# Patient Record
Sex: MALE | Race: WHITE | NOT HISPANIC OR LATINO | Employment: STUDENT | URBAN - METROPOLITAN AREA
[De-identification: names, ages, dates, MRNs, and addresses within clinical notes are randomized per-mention and may not be internally consistent; named-entity substitution may affect disease eponyms.]

---

## 2018-07-21 ENCOUNTER — OFFICE VISIT (OUTPATIENT)
Dept: URGENT CARE | Facility: CLINIC | Age: 23
End: 2018-07-21
Payer: COMMERCIAL

## 2018-07-21 VITALS
HEART RATE: 80 BPM | SYSTOLIC BLOOD PRESSURE: 114 MMHG | TEMPERATURE: 98 F | DIASTOLIC BLOOD PRESSURE: 71 MMHG | WEIGHT: 140 LBS | RESPIRATION RATE: 20 BRPM | BODY MASS INDEX: 19.6 KG/M2 | OXYGEN SATURATION: 96 % | HEIGHT: 71 IN

## 2018-07-21 DIAGNOSIS — L02.211 ABDOMINAL WALL ABSCESS: Primary | ICD-10-CM

## 2018-07-21 PROCEDURE — 3008F BODY MASS INDEX DOCD: CPT | Mod: CPTII,S$GLB,, | Performed by: EMERGENCY MEDICINE

## 2018-07-21 PROCEDURE — 90471 IMMUNIZATION ADMIN: CPT | Mod: S$GLB,,, | Performed by: EMERGENCY MEDICINE

## 2018-07-21 PROCEDURE — 99203 OFFICE O/P NEW LOW 30 MIN: CPT | Mod: 25,S$GLB,, | Performed by: EMERGENCY MEDICINE

## 2018-07-21 PROCEDURE — 90715 TDAP VACCINE 7 YRS/> IM: CPT | Mod: S$GLB,,, | Performed by: EMERGENCY MEDICINE

## 2018-07-21 RX ORDER — SULFAMETHOXAZOLE AND TRIMETHOPRIM 800; 160 MG/1; MG/1
1 TABLET ORAL 2 TIMES DAILY
Qty: 20 TABLET | Refills: 0 | Status: SHIPPED | OUTPATIENT
Start: 2018-07-21 | End: 2018-07-31

## 2018-07-21 RX ORDER — MUPIROCIN 20 MG/G
OINTMENT TOPICAL
Qty: 1 TUBE | Refills: 0 | Status: SHIPPED | OUTPATIENT
Start: 2018-07-21

## 2018-07-21 RX ORDER — GUANFACINE 4 MG/1
TABLET, EXTENDED RELEASE ORAL
COMMUNITY

## 2018-07-21 NOTE — LETTER
July 21, 2018      [facility]  [address] [address]  Phone: [phone]  Fax: [phone]       Patient: Chapito Liu   YOB: 1995  Date of Visit: 07/21/2018    To Whom It May Concern:    Ramiro Liu  was at [facility] on 07/21/2018. He can return to work tomorrow 7/22/18 but must keep his wound clean. He had a abscess drained here today.  If you have any questions or concerns, or if I can be of further assistance, please do not hesitate to contact me.    Sincerely,    Rylie Mathis MD

## 2018-07-21 NOTE — PATIENT INSTRUCTIONS
Abscess/Cellulitis   If your condition worsens or fails to improve we recommend that you receive another evaluation at the ER immediately or contact your PCP to discuss your concerns or return here. You must understand that you've received an urgent care treatment only and that you may be released before all your medical problems are known or treated. You the patient will arrange for follouwp care as instructed.   Return in 48 hours for recheck.   Use warm compresses for 20 minutes 3-5 times a day. Avoid picking or manipulating the wound. Clean the wound twice a day and put the antibiotic ointment on it. You should seek ER treatment if fever, increase pain, swelling or other signs of increasing infection.   If you are female and on BCP use additional methods to prevent pregnancy while on antibiotics and for one cycle after.   If you were given narcotics do not drive or operate heavy equipment or machinery while taking these medications.   Tylenol or ibuprofen can also be used as directed for pain unless you have an allergy to them or medical condition such as stomach ulcers, kidney or liver disease or blood thinners etc for which you should not be taking these type of medications.

## 2018-07-21 NOTE — PROGRESS NOTES
"Subjective:       Patient ID: Chapito Liu is a 23 y.o. male.    Vitals:  height is 5' 11" (1.803 m) and weight is 63.5 kg (140 lb). His oral temperature is 98.1 °F (36.7 °C). His blood pressure is 114/71 and his pulse is 80. His respiration is 20 and oxygen saturation is 96%.     Chief Complaint: Cyst    Pt thought he had a pimple on his stomach and tried to pop it a few days ago and now it is more swollen and painful. No hx DM. Needs a tetanus shot No previous hx MRSA      Cyst   This is a new problem. The current episode started in the past 7 days. The problem occurs constantly. The problem has been gradually worsening. Pertinent negatives include no chills, fever, rash or sore throat. Exacerbated by: Movement. He has tried nothing for the symptoms. The treatment provided no relief.     Review of Systems   Constitution: Negative for chills and fever.   HENT: Negative for sore throat.    Respiratory: Negative for shortness of breath.    Skin: Negative for itching and rash.   Musculoskeletal: Negative for joint pain.       Objective:      Physical Exam   Constitutional: He is oriented to person, place, and time. He appears well-developed and well-nourished.   HENT:   Head: Normocephalic and atraumatic. Head is without abrasion, without contusion and without laceration.   Right Ear: External ear normal.   Left Ear: External ear normal.   Nose: Nose normal.   Mouth/Throat: Oropharynx is clear and moist.   Minimal jaw excursion (chronic and being evaluated) Unable to see posterior oropharynx   Eyes: Conjunctivae, EOM and lids are normal. Pupils are equal, round, and reactive to light.   Neck: Trachea normal, full passive range of motion without pain and phonation normal. Neck supple.   Cardiovascular: Normal rate, regular rhythm and normal heart sounds.    Pulmonary/Chest: Effort normal and breath sounds normal. No stridor. No respiratory distress.   Musculoskeletal: Normal range of motion.   Neurological: He is " alert and oriented to person, place, and time.   Skin: Skin is warm, dry and intact. Capillary refill takes less than 2 seconds. Lesion noted. No abrasion, no bruising, no burn, no ecchymosis, no laceration and no rash noted. There is erythema.        Psychiatric: He has a normal mood and affect. His speech is normal and behavior is normal. Judgment and thought content normal. Cognition and memory are normal.   Nursing note and vitals reviewed.    Incision & Drainage  Date/Time: 7/21/2018 4:38 PM  Performed by: NIK VELAZQUEZ  Authorized by: NIK VELAZQUEZ       Type:  Abscess  Body area:  Trunk  Anesthesia:  Local infiltration  Local anesthetic:  Lidocaine 1% without epinephrinelidocaine 1% without epinephrine  Anesthetic total (ml):  5  Scalpel size:  11  Incision type:  Single straight  Complexity:  Complex  Drainage:  Pus  Drainage amount:  Moderate  Wound treatment:  Incision, wound left open, wound packed, deloculation and drainage  Packing material:  1/4 in gauze  Patient tolerance:  Patient tolerated the procedure well with no immediate complications      Assessment:       1. Abdominal wall abscess        Plan:         Abdominal wall abscess    Other orders  -     Tdap Vaccine  -     sulfamethoxazole-trimethoprim 800-160mg (BACTRIM DS) 800-160 mg Tab; Take 1 tablet by mouth 2 (two) times daily. for 10 days  Dispense: 20 tablet; Refill: 0  -     mupirocin (BACTROBAN) 2 % ointment; Apply topically to wound twice a day  Dispense: 1 Tube; Refill: 0  -     INCISION AND DRAINAGE          Patient Instructions                                                      Abscess/Cellulitis   If your condition worsens or fails to improve we recommend that you receive another evaluation at the ER immediately or contact your PCP to discuss your concerns or return here. You must understand that you've received an urgent care treatment only and that you may be released before all your medical problems are known or treated.  You the patient will arrange for follouwp care as instructed.   Return in 48 hours for recheck.   Use warm compresses for 20 minutes 3-5 times a day. Avoid picking or manipulating the wound. Clean the wound twice a day and put the antibiotic ointment on it. You should seek ER treatment if fever, increase pain, swelling or other signs of increasing infection.   If you are female and on BCP use additional methods to prevent pregnancy while on antibiotics and for one cycle after.   If you were given narcotics do not drive or operate heavy equipment or machinery while taking these medications.   Tylenol or ibuprofen can also be used as directed for pain unless you have an allergy to them or medical condition such as stomach ulcers, kidney or liver disease or blood thinners etc for which you should not be taking these type of medications.

## 2018-07-23 ENCOUNTER — CLINICAL SUPPORT (OUTPATIENT)
Dept: URGENT CARE | Facility: CLINIC | Age: 23
End: 2018-07-23
Payer: COMMERCIAL

## 2018-07-23 VITALS
BODY MASS INDEX: 19.6 KG/M2 | WEIGHT: 140 LBS | TEMPERATURE: 98 F | HEIGHT: 71 IN | OXYGEN SATURATION: 98 % | HEART RATE: 87 BPM | DIASTOLIC BLOOD PRESSURE: 65 MMHG | SYSTOLIC BLOOD PRESSURE: 116 MMHG | RESPIRATION RATE: 18 BRPM

## 2018-07-23 DIAGNOSIS — Z51.89 WOUND CHECK, ABSCESS: Primary | ICD-10-CM

## 2018-07-23 PROCEDURE — 10060 I&D ABSCESS SIMPLE/SINGLE: CPT | Mod: S$GLB,,, | Performed by: NURSE PRACTITIONER

## 2018-07-23 PROCEDURE — 99214 OFFICE O/P EST MOD 30 MIN: CPT | Mod: 25,S$GLB,, | Performed by: NURSE PRACTITIONER

## 2018-07-23 NOTE — PROCEDURES
"Incision & Drainage  Date/Time: 7/23/2018 9:41 AM  Performed by: NHAN RIVERO  Authorized by: NHAN RIVERO     Time out: Immediately prior to procedure a "time out" was called to verify the correct patient, procedure, equipment, support staff and site/side marked as required.    Consent Done?:  Yes (Verbal)    Type:  Abscess  Body area:  Trunk  Location details:  Abdomen  Drainage:  Bloody and purulent  Drainage amount:  Scant  Wound treatment:  Wound packed  Packing material:  1/4 in gauze  Patient tolerance:  Patient tolerated the procedure well with no immediate complications      "

## 2018-07-23 NOTE — LETTER
July 23, 2018      [facility] [address] [address]  Phone: [phone]  Fax: [phone]       Patient: Chapito Liu   YOB: 1995  Date of Visit: 07/23/2018    To Whom It May Concern:    Ramiro Liu  was at [facility] on 07/23/2018. He may return to work/school on 07/26/18 with no restrictions. If you have any questions or concerns, or if I can be of further assistance, please do not hesitate to contact me.    Sincerely,    Ninfa Torres, NP

## 2018-07-23 NOTE — PATIENT INSTRUCTIONS
Abscess (Incision & Drainage)  An abscess is sometimes called a boil. It happens when bacteria get trapped under the skin and start to grow. Pus forms inside the abscess as the body responds to the bacteria. An abscess can happen with an insect bite, ingrown hair, blocked oil gland, pimple, cyst, or puncture wound.  Your healthcare provider has drained the pus from your abscess. If the abscess pocket was large, your healthcare provider may have put in gauze packing. Your provider will need to remove it on your next visit. He or she may also replace it at that time. You may not need antibiotics to treat a simple abscess, unless the infection is spreading into the skin around the wound (cellulitis).  The wound will take about 1 to 2 weeks to heal, depending on the size of the abscess. Healthy tissue will grow from the bottom and sides of the opening until it seals over.  Home care  These tips can help your wound heal:  · The wound may drain for the first 2 days. Cover the wound with a clean dry dressing. Change the dressing if it becomes soaked with blood or pus.  · If a gauze packing was placed inside the abscess pocket, you may be told to remove it yourself. You may do this in the shower. Once the packing is removed, you should wash the area in the shower, or clean the area as directed by your provider. Continue to do this until the skin opening has closed. Make sure you wash your hands after changing the packing or cleaning the wound.  · If you were prescribed antibiotics, take them as directed until they are all gone.  · You may use acetaminophen or ibuprofen to control pain, unless another pain medicine was prescribed. If you have liver disease or ever had a stomach ulcer, talk with your doctor before using these medicines.  Follow-up care  Follow up with your healthcare provider, or as advised. If a gauze packing was put in your wound, it should be removed in 1 to 2 days. Check your wound every day for any  signs that the infection is getting worse. The signs are listed below.  When to seek medical advice  Call your healthcare provider right away if any of these occur:  · Increasing redness or swelling  · Red streaks in the skin leading away from the wound  · Increasing local pain or swelling  · Continued pus draining from the wound 2 days after treatment  · Fever of 100.4ºF (38ºC) or higher, or as directed by your healthcare provider  · Boil returns when you are at home  Date Last Reviewed: 9/1/2016  © 3337-2289 [employer]. [address] [place] [id number]. All rights reserved. This information is not intended as a substitute for professional medical care. Always follow your healthcare professional's instructions.    Please arrange follow up with your primary medical clinic as soon as possible. You must understand that you've received an Urgent Care treatment only and that you may be released before all of your medical problems are known or treated. You, the patient, will arrange for follow up as instructed. If your symptoms worsen or fail to improve you should go to the Emergency Room.

## 2018-07-23 NOTE — PROGRESS NOTES
"Subjective:       Patient ID: Chapito Liu is a 23 y.o. male.    Vitals:  height is 5' 11" (1.803 m) and weight is 63.5 kg (140 lb). His tympanic temperature is 98 °F (36.7 °C). His blood pressure is 116/65 and his pulse is 87. His respiration is 18 and oxygen saturation is 98%.     Chief Complaint: Abscess    Patient presents with complaint of abscess. Patient was here two days ago and saw Dr. Mathis. He had the area lanced. Patient reports the area is not worse. No chills or fever. Patient states there were two more areas of redness so he has been applying mupirocin to wounds.     Packing removed from open incision to LLQ abd and re-packed, pt tolerated procedure well. Pt admits area is painful but refused pain medication, states he does not even take tylenol/Motrin.        Abscess   Chronicity:  New  Incident onset: few days.  Progression Since Onset: unchanged  Associated Symptoms: no fever, no chills  Characteristics: painful and redness    Pain Scale:  5/10  Treatments Tried:  Topical antibiotics and oral antibiotics    Review of Systems   Constitution: Negative for chills and fever.   HENT: Negative for sore throat.    Respiratory: Negative for shortness of breath.    Skin: Negative for itching and rash.        redness   Musculoskeletal: Negative for joint pain.   All other systems reviewed and are negative.      Objective:      Physical Exam   Constitutional: He is oriented to person, place, and time. He appears well-developed and well-nourished.   HENT:   Head: Normocephalic and atraumatic. Head is without abrasion, without contusion and without laceration.   Right Ear: External ear normal.   Left Ear: External ear normal.   Nose: Nose normal.   Mouth/Throat: Oropharynx is clear and moist.   Eyes: Conjunctivae, EOM and lids are normal. Pupils are equal, round, and reactive to light.   Neck: Trachea normal, full passive range of motion without pain and phonation normal. Neck supple.   Cardiovascular: " Normal rate, regular rhythm and normal heart sounds.    Pulmonary/Chest: Effort normal and breath sounds normal. No stridor. No respiratory distress.   Musculoskeletal: Normal range of motion.   Neurological: He is alert and oriented to person, place, and time.   Skin: Skin is warm, dry and intact. Capillary refill takes less than 2 seconds. No abrasion, no bruising, no burn, no ecchymosis, no laceration, no lesion and no rash noted. There is erythema.        Psychiatric: He has a normal mood and affect. His speech is normal and behavior is normal. Judgment and thought content normal. Cognition and memory are normal.   Nursing note and vitals reviewed.      Assessment:       1. Wound check, abscess        Plan:     pt instructed to f/u in 48hrs for wound re-check. Apply warm compresses and mupirocin ointment to x2 abscess RLQ.     Wound check, abscess  -     INCISION AND DRAINAGE

## 2018-07-27 ENCOUNTER — CLINICAL SUPPORT (OUTPATIENT)
Dept: URGENT CARE | Facility: CLINIC | Age: 23
End: 2018-07-27
Payer: COMMERCIAL

## 2018-07-27 VITALS
DIASTOLIC BLOOD PRESSURE: 66 MMHG | HEIGHT: 71 IN | BODY MASS INDEX: 19.6 KG/M2 | TEMPERATURE: 98 F | OXYGEN SATURATION: 98 % | RESPIRATION RATE: 18 BRPM | WEIGHT: 140 LBS | SYSTOLIC BLOOD PRESSURE: 111 MMHG | HEART RATE: 71 BPM

## 2018-07-27 DIAGNOSIS — Z51.89 WOUND CHECK, ABSCESS: Primary | ICD-10-CM

## 2018-07-27 PROCEDURE — 99499 UNLISTED E&M SERVICE: CPT | Mod: S$GLB,,, | Performed by: NURSE PRACTITIONER

## 2018-07-27 NOTE — LETTER
July 27, 2018      [facility] [address] [address]  Phone: [phone]  Fax: [phone]       Patient: Chapito Liu   YOB: 1995  Date of Visit: 07/27/2018    To Whom It May Concern:    Ramiro Liu  was at [facility] on 07/21/18. He may return to work/school on 07/30/18 with no restrictions. If you have any questions or concerns, or if I can be of further assistance, please do not hesitate to contact me.    Sincerely,    Ninfa Torres, NP

## 2018-07-27 NOTE — PROGRESS NOTES
"Subjective:       Patient ID: Chapito Liu is a 23 y.o. male.    Vitals:  height is 5' 11" (1.803 m) and weight is 63.5 kg (140 lb). His oral temperature is 98.3 °F (36.8 °C). His blood pressure is 111/66 and his pulse is 71. His respiration is 18 and oxygen saturation is 98%.     Chief Complaint: Wound Check    Pt was instructed to return to have an abscess on his stomach checked. Pt reports mild pain but feels it is healing well. Pt reports mild drainage.       Wound Check   He was originally treated 10 to 14 days ago. Previous treatment included I&D of abscess. There has been bloody discharge from the wound. There is no redness present. There is no swelling present. The pain has improved. He has no difficulty moving the affected extremity or digit.     Review of Systems   Constitution: Negative for chills and fever.   HENT: Negative for sore throat.    Eyes: Negative for blurred vision.   Cardiovascular: Negative for chest pain.   Respiratory: Negative for shortness of breath.    Skin: Negative for rash.   Musculoskeletal: Negative for back pain and joint pain.   Gastrointestinal: Negative for abdominal pain, diarrhea, nausea and vomiting.   Neurological: Negative for headaches.   Psychiatric/Behavioral: The patient is not nervous/anxious.    All other systems reviewed and are negative.      Objective:      Physical Exam   Constitutional: He is oriented to person, place, and time. He appears well-developed and well-nourished.   HENT:   Head: Normocephalic and atraumatic. Head is without abrasion, without contusion and without laceration.   Right Ear: External ear normal.   Left Ear: External ear normal.   Nose: Nose normal.   Mouth/Throat: Oropharynx is clear and moist.   Eyes: Conjunctivae, EOM and lids are normal. Pupils are equal, round, and reactive to light.   Neck: Trachea normal, full passive range of motion without pain and phonation normal. Neck supple.   Cardiovascular: Normal rate, regular rhythm " and normal heart sounds.    Pulmonary/Chest: Effort normal and breath sounds normal. No stridor. No respiratory distress.   Musculoskeletal: Normal range of motion.   Neurological: He is alert and oriented to person, place, and time.   Skin: Skin is warm, dry and intact. Capillary refill takes less than 2 seconds. No abrasion, no bruising, no burn, no ecchymosis, no laceration, no lesion and no rash noted. No erythema.        Psychiatric: He has a normal mood and affect. His speech is normal and behavior is normal. Judgment and thought content normal. Cognition and memory are normal.   Nursing note and vitals reviewed.      Assessment:       1. Wound check, abscess        Plan:     Follow up as needed. Keep wound clean and dry, continue to use Bactroban ointment.     Wound check, abscess

## 2018-07-27 NOTE — PATIENT INSTRUCTIONS
Wound Check, No Infection  Your wound is healing as expected. There are no signs of infection.   Home care  Continue to care for your wound as directed.  · Cover your wound with a bandage unless your healthcare provider tells you not to.  · Gently clean your wound with soap and water when you shower.   · Unless told otherwise, avoid swimming and taking tub baths until your wound has healed.  Follow-up care  Follow up with your healthcare provider as advised.  · If you have sutures or staples, return as directed to have them removed. If they are not taken out on time, they may be harder to remove and scarring may be worse. Infection may develop.  · If surgical tape strips were used, you can remove them yourself if they have not fallen off by 10 days after they were applied.   When to seek medical advice  Call your healthcare provider right away if any of these occur:  · Fever of 100.4ºF (38ºC) or higher, or as directed by your health care provider  · Symptoms of a wound infection, including:  ¨ Redness or swelling around the wound  ¨ Warmth coming from the wound  ¨ New or worsening pain  ¨ Red streaks around the wound  ¨ Draining pus  Date Last Reviewed: 8/24/2015  © 1181-9454 [employer]. [address] [place] [address], [place] [address]. All rights reserved. This information is not intended as a substitute for professional medical care. Always follow your healthcare professional's instructions.      Please arrange follow up with your primary medical clinic as soon as possible. You must understand that you've received an Urgent Care treatment only and that you may be released before all of your medical problems are known or treated. You, the patient, will arrange for follow up as instructed. If your symptoms worsen or fail to improve you should go to the Emergency Room.

## 2018-07-29 ENCOUNTER — TELEPHONE (OUTPATIENT)
Dept: URGENT CARE | Facility: CLINIC | Age: 23
End: 2018-07-29

## 2019-05-27 ENCOUNTER — OFFICE VISIT (OUTPATIENT)
Dept: URGENT CARE | Facility: CLINIC | Age: 24
End: 2019-05-27
Payer: COMMERCIAL

## 2019-05-27 VITALS
RESPIRATION RATE: 18 BRPM | OXYGEN SATURATION: 98 % | BODY MASS INDEX: 19.6 KG/M2 | WEIGHT: 140 LBS | TEMPERATURE: 98 F | DIASTOLIC BLOOD PRESSURE: 80 MMHG | SYSTOLIC BLOOD PRESSURE: 124 MMHG | HEIGHT: 71 IN | HEART RATE: 85 BPM

## 2019-05-27 DIAGNOSIS — B34.9 VIRAL SYNDROME: Primary | ICD-10-CM

## 2019-05-27 PROCEDURE — 99214 OFFICE O/P EST MOD 30 MIN: CPT | Mod: S$GLB,,, | Performed by: FAMILY MEDICINE

## 2019-05-27 PROCEDURE — 3008F PR BODY MASS INDEX (BMI) DOCUMENTED: ICD-10-PCS | Mod: CPTII,S$GLB,, | Performed by: FAMILY MEDICINE

## 2019-05-27 PROCEDURE — 3008F BODY MASS INDEX DOCD: CPT | Mod: CPTII,S$GLB,, | Performed by: FAMILY MEDICINE

## 2019-05-27 PROCEDURE — 99214 PR OFFICE/OUTPT VISIT, EST, LEVL IV, 30-39 MIN: ICD-10-PCS | Mod: S$GLB,,, | Performed by: FAMILY MEDICINE

## 2019-05-27 NOTE — LETTER
May 27, 2019      [facility]  [address] [address]  Phone: [phone]  Fax: [phone]       Patient: Chapito Liu   YOB: 1995  Date of Visit: 05/27/2019    To Whom It May Concern:    Ramiro Liu  was at [facility] on 05/27/2019 for symptoms of a viral syndrome that started on 5/23/19. His symptoms have now subsided. He may return to work/school on 5/27/19 with no restrictions. If you have any questions or concerns, or if I can be of further assistance, please do not hesitate to contact me.    Sincerely,    Pita Messer, DO

## 2019-05-27 NOTE — PROGRESS NOTES
"Subjective:       Patient ID: Chapito Liu is a 24 y.o. male.    Vitals:  height is 5' 11" (1.803 m) and weight is 63.5 kg (140 lb). His temperature is 97.8 °F (36.6 °C). His blood pressure is 124/80 and his pulse is 85. His respiration is 18 and oxygen saturation is 98%.     Chief Complaint: No chief complaint on file.    Abdominal Pain   This is a new problem. Episode onset: 4 days ago all symptoms have subsided. The onset quality is sudden. The most recent episode lasted 12 hours. The problem has been unchanged. The pain is located in the generalized abdominal region. The pain is at a severity of 0/10. The patient is experiencing no pain. Associated symptoms include nausea and vomiting. Pertinent negatives include no constipation, diarrhea, dysuria or fever. There is no history of abdominal surgery.       Constitution: Negative for appetite change, chills, sweating and fever.   Cardiovascular: Negative for chest pain.   Respiratory: Negative for shortness of breath.    Gastrointestinal: Positive for abdominal pain, nausea and vomiting. Negative for abdominal trauma, abdominal bloating, history of abdominal surgery, constipation, diarrhea, dark colored stools and heartburn.   Genitourinary: Negative for dysuria and pelvic pain.   Musculoskeletal: Negative for back pain.       Objective:      Physical Exam   Constitutional: He is oriented to person, place, and time. He appears well-developed and well-nourished.   HENT:   Head: Normocephalic and atraumatic.   Right Ear: External ear normal.   Left Ear: External ear normal.   Nose: Nose normal.   Mouth/Throat: Oropharynx is clear and moist.   Eyes: Pupils are equal, round, and reactive to light. Conjunctivae and EOM are normal.   Neck: Normal range of motion. Neck supple. No JVD present. No thyromegaly present.   Cardiovascular: Normal rate, regular rhythm, normal heart sounds and intact distal pulses.   No murmur heard.  Pulmonary/Chest: Effort normal and " "breath sounds normal.   Abdominal: Soft. He exhibits no mass. There is no tenderness.   Musculoskeletal: Normal range of motion. He exhibits no edema.   Lymphadenopathy:     He has no cervical adenopathy.   Neurological: He is alert and oriented to person, place, and time. He has normal reflexes.   Skin: Skin is warm and dry.   Psychiatric: He has a normal mood and affect. His behavior is normal. Judgment and thought content normal.   Nursing note and vitals reviewed.      Assessment:       1. Viral syndrome        Plan:         Viral syndrome      Patient Instructions     Viral Syndrome (Adult)  A viral illness may cause a number of symptoms. The symptoms depend on the part of the body that the virus affects. If it settles in your nose, throat, and lungs, it may cause cough, sore throat, congestion, and sometimes headache. If it settles in your stomach and intestinal tract, it may cause vomiting and diarrhea. Sometimes it causes vague symptoms like "aching all over," feeling tired, loss of appetite, or fever.  A viral illness usually lasts 1 to 2 weeks, but sometimes it lasts longer. In some cases, a more serious infection can look like a viral syndrome in the first few days of the illness. You may need another exam and additional tests to know the difference. Watch for the warning signs listed below.  Home care  Follow these guidelines for taking care of yourself at home:  · If symptoms are severe, rest at home for the first 2 to 3 days.  · Stay away from cigarette smoke - both your smoke and the smoke from others.  · You may use over-the-counter acetaminophen or ibuprofen for fever, muscle aching, and headache, unless another medicine was prescribed for this. If you have chronic liver or kidney disease or ever had a stomach ulcer or GI bleeding, talk with your doctor before using these medicines. No one who is younger than 18 and ill with a fever should take aspirin. It may cause severe disease or death.  · Your " appetite may be poor, so a light diet is fine. Avoid dehydration by drinking 8 to 12 8-ounce glasses of fluids each day. This may include water; orange juice; lemonade; apple, grape, and cranberry juice; clear fruit drinks; electrolyte replacement and sports drinks; and decaffeinated teas and coffee. If you have been diagnosed with a kidney disease, ask your doctor how much and what types of fluids you should drink to prevent dehydration. If you have kidney disease, drinking too much fluid can cause it build up in the your body and be dangerous to your health.  · Over-the-counter remedies won't shorten the length of the illness but may be helpful for cough, sore throat; and nasal and sinus congestion. Don't use decongestants if you have high blood pressure.  Follow-up care  Follow up with your healthcare provider if you do not improve over the next week.  Call 911  Get emergency medical care if any of the following occur:  · Convulsion  · Feeling weak, dizzy, or like you are going to faint  · Chest pain, shortness of breath, wheezing, or difficulty breathing  When to seek medical advice  Call your healthcare provider right away if any of these occur:  · Cough with lots of colored sputum (mucus) or blood in your sputum  · Chest pain, shortness of breath, wheezing, or difficulty breathing  · Severe headache; face, neck, or ear pain  · Severe, constant pain in the lower right side of your belly (abdominal)  · Continued vomiting (cant keep liquids down)  · Frequent diarrhea (more than 5 times a day); blood (red or black color) or mucus in diarrhea  · Feeling weak, dizzy, or like you are going to faint  · Extreme thirst  · Fever of 100.4°F (38°C) or higher, or as directed by your healthcare provider  Date Last Reviewed: 9/25/2015  © 9481-6637 [employer]. [address] [place] [address], [place] [address]. All rights reserved. This information is not intended as a substitute for professional medical care. Always  follow your healthcare professional's instructions.

## 2019-05-27 NOTE — PATIENT INSTRUCTIONS
"  Viral Syndrome (Adult)  A viral illness may cause a number of symptoms. The symptoms depend on the part of the body that the virus affects. If it settles in your nose, throat, and lungs, it may cause cough, sore throat, congestion, and sometimes headache. If it settles in your stomach and intestinal tract, it may cause vomiting and diarrhea. Sometimes it causes vague symptoms like "aching all over," feeling tired, loss of appetite, or fever.  A viral illness usually lasts 1 to 2 weeks, but sometimes it lasts longer. In some cases, a more serious infection can look like a viral syndrome in the first few days of the illness. You may need another exam and additional tests to know the difference. Watch for the warning signs listed below.  Home care  Follow these guidelines for taking care of yourself at home:  · If symptoms are severe, rest at home for the first 2 to 3 days.  · Stay away from cigarette smoke - both your smoke and the smoke from others.  · You may use over-the-counter acetaminophen or ibuprofen for fever, muscle aching, and headache, unless another medicine was prescribed for this. If you have chronic liver or kidney disease or ever had a stomach ulcer or GI bleeding, talk with your doctor before using these medicines. No one who is younger than 18 and ill with a fever should take aspirin. It may cause severe disease or death.  · Your appetite may be poor, so a light diet is fine. Avoid dehydration by drinking 8 to 12 8-ounce glasses of fluids each day. This may include water; orange juice; lemonade; apple, grape, and cranberry juice; clear fruit drinks; electrolyte replacement and sports drinks; and decaffeinated teas and coffee. If you have been diagnosed with a kidney disease, ask your doctor how much and what types of fluids you should drink to prevent dehydration. If you have kidney disease, drinking too much fluid can cause it build up in the your body and be dangerous to your " health.  · Over-the-counter remedies won't shorten the length of the illness but may be helpful for cough, sore throat; and nasal and sinus congestion. Don't use decongestants if you have high blood pressure.  Follow-up care  Follow up with your healthcare provider if you do not improve over the next week.  Call 911  Get emergency medical care if any of the following occur:  · Convulsion  · Feeling weak, dizzy, or like you are going to faint  · Chest pain, shortness of breath, wheezing, or difficulty breathing  When to seek medical advice  Call your healthcare provider right away if any of these occur:  · Cough with lots of colored sputum (mucus) or blood in your sputum  · Chest pain, shortness of breath, wheezing, or difficulty breathing  · Severe headache; face, neck, or ear pain  · Severe, constant pain in the lower right side of your belly (abdominal)  · Continued vomiting (cant keep liquids down)  · Frequent diarrhea (more than 5 times a day); blood (red or black color) or mucus in diarrhea  · Feeling weak, dizzy, or like you are going to faint  · Extreme thirst  · Fever of 100.4°F (38°C) or higher, or as directed by your healthcare provider  Date Last Reviewed: 9/25/2015  © 5289-5324 [employer]. [address] [place] [address], [place] [address]. All rights reserved. This information is not intended as a substitute for professional medical care. Always follow your healthcare professional's instructions.

## 2020-03-22 ENCOUNTER — HOSPITAL ENCOUNTER (EMERGENCY)
Facility: HOSPITAL | Age: 25
Discharge: HOME | End: 2020-03-22
Attending: EMERGENCY MEDICINE
Payer: COMMERCIAL

## 2020-03-22 VITALS
RESPIRATION RATE: 18 BRPM | SYSTOLIC BLOOD PRESSURE: 132 MMHG | HEART RATE: 77 BPM | TEMPERATURE: 98 F | DIASTOLIC BLOOD PRESSURE: 79 MMHG | BODY MASS INDEX: 19.53 KG/M2 | WEIGHT: 140 LBS | OXYGEN SATURATION: 99 %

## 2020-03-22 DIAGNOSIS — R10.11 RUQ ABDOMINAL PAIN: ICD-10-CM

## 2020-03-22 DIAGNOSIS — R74.01 TRANSAMINITIS: Primary | ICD-10-CM

## 2020-03-22 LAB
ALBUMIN SERPL BCP-MCNC: 4.6 G/DL (ref 3.5–5.2)
ALP SERPL-CCNC: 68 U/L (ref 55–135)
ALT SERPL W/O P-5'-P-CCNC: 1026 U/L (ref 10–44)
ANION GAP SERPL CALC-SCNC: 13 MMOL/L (ref 8–16)
AST SERPL-CCNC: 1562 U/L (ref 10–40)
BASOPHILS # BLD AUTO: 0.1 K/UL (ref 0–0.2)
BASOPHILS NFR BLD: 1.2 % (ref 0–1.9)
BILIRUB SERPL-MCNC: 1.1 MG/DL (ref 0.1–1)
BUN SERPL-MCNC: 10 MG/DL (ref 6–20)
CALCIUM SERPL-MCNC: 10 MG/DL (ref 8.7–10.5)
CHLORIDE SERPL-SCNC: 101 MMOL/L (ref 95–110)
CO2 SERPL-SCNC: 19 MMOL/L (ref 23–29)
CREAT SERPL-MCNC: 1 MG/DL (ref 0.5–1.4)
DIFFERENTIAL METHOD: NORMAL
EOSINOPHIL # BLD AUTO: 0.2 K/UL (ref 0–0.5)
EOSINOPHIL NFR BLD: 2.2 % (ref 0–8)
ERYTHROCYTE [DISTWIDTH] IN BLOOD BY AUTOMATED COUNT: 13.1 % (ref 11.5–14.5)
EST. GFR  (AFRICAN AMERICAN): >60 ML/MIN/1.73 M^2
EST. GFR  (NON AFRICAN AMERICAN): >60 ML/MIN/1.73 M^2
GLUCOSE SERPL-MCNC: 105 MG/DL (ref 70–110)
HCT VFR BLD AUTO: 52 % (ref 40–54)
HGB BLD-MCNC: 17.1 G/DL (ref 14–18)
IMM GRANULOCYTES # BLD AUTO: 0.03 K/UL (ref 0–0.04)
IMM GRANULOCYTES NFR BLD AUTO: 0.4 % (ref 0–0.5)
LIPASE SERPL-CCNC: 11 U/L (ref 4–60)
LYMPHOCYTES # BLD AUTO: 1.7 K/UL (ref 1–4.8)
LYMPHOCYTES NFR BLD: 20 % (ref 18–48)
MCH RBC QN AUTO: 29.5 PG (ref 27–31)
MCHC RBC AUTO-ENTMCNC: 32.9 G/DL (ref 32–36)
MCV RBC AUTO: 90 FL (ref 82–98)
MONOCYTES # BLD AUTO: 0.7 K/UL (ref 0.3–1)
MONOCYTES NFR BLD: 8.8 % (ref 4–15)
NEUTROPHILS # BLD AUTO: 5.6 K/UL (ref 1.8–7.7)
NEUTROPHILS NFR BLD: 67.4 % (ref 38–73)
NRBC BLD-RTO: 0 /100 WBC
PLATELET # BLD AUTO: 235 K/UL (ref 150–350)
PMV BLD AUTO: 11.1 FL (ref 9.2–12.9)
POTASSIUM SERPL-SCNC: 3.9 MMOL/L (ref 3.5–5.1)
PROT SERPL-MCNC: 8.4 G/DL (ref 6–8.4)
RBC # BLD AUTO: 5.79 M/UL (ref 4.6–6.2)
SODIUM SERPL-SCNC: 133 MMOL/L (ref 136–145)
WBC # BLD AUTO: 8.31 K/UL (ref 3.9–12.7)

## 2020-03-22 PROCEDURE — S0028 INJECTION, FAMOTIDINE, 20 MG: HCPCS | Performed by: GENERAL PRACTICE

## 2020-03-22 PROCEDURE — 80053 COMPREHEN METABOLIC PANEL: CPT

## 2020-03-22 PROCEDURE — 80074 ACUTE HEPATITIS PANEL: CPT

## 2020-03-22 PROCEDURE — 96374 THER/PROPH/DIAG INJ IV PUSH: CPT

## 2020-03-22 PROCEDURE — 85025 COMPLETE CBC W/AUTO DIFF WBC: CPT

## 2020-03-22 PROCEDURE — 25000003 PHARM REV CODE 250: Performed by: GENERAL PRACTICE

## 2020-03-22 PROCEDURE — 96361 HYDRATE IV INFUSION ADD-ON: CPT

## 2020-03-22 PROCEDURE — 99284 EMERGENCY DEPT VISIT MOD MDM: CPT | Mod: 25

## 2020-03-22 PROCEDURE — 96375 TX/PRO/DX INJ NEW DRUG ADDON: CPT | Mod: 59

## 2020-03-22 PROCEDURE — 83690 ASSAY OF LIPASE: CPT

## 2020-03-22 PROCEDURE — 99285 EMERGENCY DEPT VISIT HI MDM: CPT | Mod: ,,, | Performed by: EMERGENCY MEDICINE

## 2020-03-22 PROCEDURE — 63600175 PHARM REV CODE 636 W HCPCS: Performed by: GENERAL PRACTICE

## 2020-03-22 PROCEDURE — 86703 HIV-1/HIV-2 1 RESULT ANTBDY: CPT

## 2020-03-22 PROCEDURE — 99285 PR EMERGENCY DEPT VISIT,LEVEL V: ICD-10-PCS | Mod: ,,, | Performed by: EMERGENCY MEDICINE

## 2020-03-22 RX ORDER — SODIUM CHLORIDE 9 MG/ML
1000 INJECTION, SOLUTION INTRAVENOUS
Status: COMPLETED | OUTPATIENT
Start: 2020-03-22 | End: 2020-03-22

## 2020-03-22 RX ORDER — ONDANSETRON 2 MG/ML
4 INJECTION INTRAMUSCULAR; INTRAVENOUS
Status: COMPLETED | OUTPATIENT
Start: 2020-03-22 | End: 2020-03-22

## 2020-03-22 RX ORDER — FAMOTIDINE 10 MG/ML
20 INJECTION INTRAVENOUS
Status: COMPLETED | OUTPATIENT
Start: 2020-03-22 | End: 2020-03-22

## 2020-03-22 RX ADMIN — FAMOTIDINE 20 MG: 10 INJECTION INTRAVENOUS at 01:03

## 2020-03-22 RX ADMIN — SODIUM CHLORIDE 1000 ML: 0.9 INJECTION, SOLUTION INTRAVENOUS at 01:03

## 2020-03-22 RX ADMIN — ONDANSETRON 4 MG: 2 INJECTION INTRAMUSCULAR; INTRAVENOUS at 01:03

## 2020-03-22 RX ADMIN — ALUMINUM HYDROXIDE, MAGNESIUM HYDROXIDE, AND SIMETHICONE 50 ML: 200; 200; 20 SUSPENSION ORAL at 01:03

## 2020-03-22 NOTE — ED TRIAGE NOTES
Pt arrived to ED with complaints of abdominal pain to the right upper quadrant. Pain is not severe but he states it has been bothering him for a couple of months. Pt has  vomited 4 times today. The first couple times he vomited bile and then was dry heaving. Pt denies blood in emesis , stools, or urine. No change in urinary or bowel habits. Pt denies respiratory symptoms or chest pain.     Pt aaoX4 and ambulatory with no assistance needed .     Psychosocial:  Patient is calm and cooperative.  Patients insight and judgement are appropriate to situation.  Appears clean, well maintained, with clothing appropriate to environment.  No evidence of delusions, hallucinations, or psychosis.     Neuro:  Eyes open spontaneously.  Awake, alert, oriented x 4.  Speech clear and appropriate.  Tolerating saliva secretions well.  Able to follow commands, demonstrating ability to actively and appropriately communicate within context of current conversation.  Symmetrical facial muscles.  Moving all extremities well with no noted weakness.  Adequate muscle tone present.    Movement is purposeful.  No evidence of impaired sensation.  Response to external stimuli appropriately.  No noted drifts.     Airway:  Bilateral chest rise and fall.  RR regular and non-labored.  Air entry patent with and clear x 5 lobes of the lungs.  No crepitus or subcutaneous emphysema noted on palpation.       Circulatory:  Skin warm, dry, and pink.  Apical and radial pulses strong and regular.  Capillary refill/skin blanching less than 3 seconds to distal of 4 extremities.     Abdomen:  Abdomen soft and non-distended.  Positive normo-active bowel sounds x 4 quadrants.       Urinary: Denies pressure, frequency, urgency, odor or pain.     Extremities:  No redness, heat, deformity, or pain.     Skin:  Intact with no bruising/discolorations noted.

## 2020-03-22 NOTE — ED PROVIDER NOTES
Encounter Date: 3/22/2020       History     Chief Complaint   Patient presents with    Abdominal Pain     for three months that worsened tonight. pain is RUQ and radiates to back     Chapito Liu is a 25 year old male with tobacco dependence presenting to the emergency department for evaluation of x three month's of intermittent RUQ pain (sharp/gnawing) associated with nausea/emesis.  Denies any associated worsening or improvement with food or drink; described vomitus as bilious earlier today; denies fevers, chills, myalgias, RUQ radiating into right flank.  Denies any dysuria/hematuria, CP, SOB.  Rated pain about a 5/10 today; otherwise no acute health concerns at this time.         Review of patient's allergies indicates:  No Known Allergies  No past medical history on file.  No past surgical history on file.  Family History   Problem Relation Age of Onset    No Known Problems Mother     No Known Problems Father      Social History     Tobacco Use    Smoking status: Current Every Day Smoker    Smokeless tobacco: Never Used   Substance Use Topics    Alcohol use: Yes    Drug use: No     Review of Systems   Constitutional: Positive for activity change and appetite change. Negative for fatigue and fever.   HENT: Negative for trouble swallowing and voice change.    Eyes: Negative for photophobia and visual disturbance.   Respiratory: Negative for shortness of breath, wheezing and stridor.    Cardiovascular: Negative for chest pain and palpitations.   Gastrointestinal: Positive for abdominal pain, nausea and vomiting.   Genitourinary: Negative for dysuria, flank pain, frequency and hematuria.   Musculoskeletal: Negative for arthralgias and myalgias.   Skin: Negative for color change and wound.   Neurological: Negative for dizziness, light-headedness and headaches.   Psychiatric/Behavioral: Negative for agitation, behavioral problems and confusion.     Physical Exam     Initial Vitals [03/22/20 0034]   BP  Pulse Resp Temp SpO2   129/84 95 20 97.9 °F (36.6 °C) 98 %      MAP       --         Physical Exam    Nursing note and vitals reviewed.  Constitutional: He appears well-developed and well-nourished. He is not diaphoretic. He does not appear ill. No distress.   Appear's uncomfortable.    Eyes: Pupils are equal, round, and reactive to light. No scleral icterus.   Cardiovascular: Normal rate, regular rhythm and normal heart sounds.   Pulmonary/Chest: Effort normal and breath sounds normal. No respiratory distress. He has no wheezes.   Abdominal: Soft. Normal appearance and bowel sounds are normal. He exhibits no mass. There is tenderness in the right upper quadrant. There is positive Dove's sign. There is no rigidity and no guarding. No hernia. Hernia confirmed negative in the ventral area.   Neurological: He is alert and oriented to person, place, and time.   Skin: Skin is warm. No erythema. No pallor.   Psychiatric: He has a normal mood and affect. His behavior is normal.       ED Course   Procedures  Labs Reviewed   COMPREHENSIVE METABOLIC PANEL - Abnormal; Notable for the following components:       Result Value    Sodium 133 (*)     CO2 19 (*)     Total Bilirubin 1.1 (*)     AST 1,562 (*)     ALT 1,026 (*)     All other components within normal limits   CBC W/ AUTO DIFFERENTIAL   LIPASE   HEPATITIS PANEL, ACUTE   HIV 1 / 2 ANTIBODY   HEPATITIS PANEL, ACUTE   HIV 1 / 2 ANTIBODY          Imaging Results          US Abdomen Limited (Gallbladder) (Final result)  Result time 03/22/20 02:42:19    Final result by Brain Patrick MD (03/22/20 02:42:19)                 Impression:      No evidence of cholelithiasis or acute cholecystitis.    Electronically signed by resident: Dori Hi  Date:    03/22/2020  Time:    02:07    Electronically signed by: Brain Patrick MD  Date:    03/22/2020  Time:    02:42             Narrative:    EXAMINATION:  US ABDOMEN LIMITED    CLINICAL HISTORY:  Right upper quadrant  pain    TECHNIQUE:  Limited ultrasound of the right upper quadrant of the abdomen was performed, with special attention to the gallbladder and biliary system.    COMPARISON:  None.    FINDINGS:  The partially visualized liver is within normal limits.    There is no intra or extrahepatic bile duct dilatation.  The common duct measures 2 mm.    The gallbladder is unremarkable with no evidence of wall thickening, pericholecystic fluid, sonographic Dove's sign, or cholelithiasis.    The visualized portions of the pancreas are unremarkable.    There is no free fluid within the visualized abdomen.  Limited visualization of the right kidney is negative for hydronephrosis or other acute abnormality.                                 Medical Decision Making:   History:   Old Medical Records: I decided to obtain old medical records.  Initial Assessment:   25 year old male with no significant past medical history presented to the emergency department with RUQ x 3 month's with nausea/vomiting.  DDx includes but not limited to acute cholecystitis, cholelithiasis, cholangitis, pancreatitis, choledocholithiasis, hepatitis/HIV.  Basic labs, lipase, UA, RUQ ultrasounds, IV zofran and pepcid, GI cocktail.  Will continue to follow closely at this time.       Ran Garcia DO  PGY-2 Emergency Medicine  2:11 AM    Clinical Tests:   Lab Tests: Ordered and Reviewed  Radiological Study: Ordered and Reviewed  Medical Tests: Ordered and Reviewed  ED Management:  2:11 AM Significantly elevated AST/ALT with elevated T.Bili; no elevation in alkaline phosphatase.  WBC (normal); afebrile.  Suspect sub-acute hepatitis versus cholecystitis.  Hepatitis panel/HIV added-on to evaluation.  RUQ ultrasound pending at this time.      3:13 AM RUQ without evidence of acute cholecystitis; findings of imaging and labs discussed with patient; patient now more truthful about 6-12 beer's daily with 1/2 pint of whiskey every day -to- every other day.  Hepatitis  panel and HIV pending so suspicious for viral/infectious hepatitis versus alcoholic hepatitis (given AST > ALT); discuss appropriate cessation method's for lowering the amount of alcohol intake to limit withdrawal; given appropriate resources for outpatient follow-up.  Referral placed for GI clinic for outpatient follow-up and evaluation.  Strict ED return precautions discussed; discharged home in stable condition.    Other:   I discussed test(s) with the performing physician.  I have discussed this case with another health care provider.              Attending Attestation:   Physician Attestation Statement for Resident:  As the supervising MD   Physician Attestation Statement: I have personally seen and examined this patient.   I agree with the above history. -:   As the supervising MD I agree with the above PE.   -: NAD, NCAT, A&Ox3, normal sclera, PERRL and EOMI, neck supple/normal ROM, CTAB, RRR no mrg, normal extremity ROM/m/s, abd s/nd/mild RUQ TTP w/o rebound, no CVAT,no LE edema, skin dry and warm with normal color   As the supervising MD I agree with the above treatment, course, plan, and disposition.  I have reviewed and agree with the residents interpretation of the following: lab data.  I have reviewed the following: old records at this facility.                                  Clinical Impression:       ICD-10-CM ICD-9-CM   1. Transaminitis R74.0 790.4   2. RUQ abdominal pain R10.11 789.01             ED Disposition Condition    Discharge Stable        ED Prescriptions     None        Follow-up Information     Follow up With Specialties Details Why Contact Info Additional Information    [facility] Emergency Medicine Go to  As needed, If symptoms worsen [facility] [facility] [id number] [phone]     Rhonda Lugo MD Pediatrics Schedule an appointment as soon as possible for a visit  in 1-2 day's for outpatient follow-up from your ED visit. [address]  [facility] [address]  [phone]       [facility] Gastroenterology Call  to schedule an appointment for outpatient follow-up [address]  [facility] [address] [phone] [facility] - 4th Floor                                     Ran Garcia DO  Resident  03/22/20 4056       Sara Hilton MD  03/22/20 1737

## 2020-03-23 LAB
HAV IGM SERPL QL IA: NEGATIVE
HBV CORE IGM SERPL QL IA: NEGATIVE
HBV SURFACE AG SERPL QL IA: NEGATIVE
HCV AB SERPL QL IA: NEGATIVE
HIV 1+2 AB+HIV1 P24 AG SERPL QL IA: NEGATIVE

## 2020-03-24 ENCOUNTER — TELEPHONE (OUTPATIENT)
Dept: HEPATOLOGY | Facility: CLINIC | Age: 25
End: 2020-03-24

## 2020-03-24 ENCOUNTER — PATIENT MESSAGE (OUTPATIENT)
Dept: HEPATOLOGY | Facility: CLINIC | Age: 25
End: 2020-03-24

## 2020-03-24 DIAGNOSIS — R74.8 ELEVATED LIVER ENZYMES: Chronic | ICD-10-CM

## 2020-03-24 NOTE — TELEPHONE ENCOUNTER
----- Message from Olive Salazar RN sent at 3/23/2020 12:26 PM CDT -----      ----- Message -----  From: Ashley Balbuena  Sent: 3/23/2020  12:17 PM CDT  To: [facility] Clinical Staff    Pt has referral in epic for RUQ abdominal pain and Transaminitis and is calling to sched appt    Pt contact [phone]

## 2020-03-24 NOTE — TELEPHONE ENCOUNTER
MA spoke to the pt. He said that he just downloaded the [employer] blake, but he cannot do a video visit at this time because he has no computer and his camera is broken on the front of the screen. Lydia stated that this pt is urgent and needs to be seen soon, but we are not taking patients in clinic at this time. I will get with Dr. Monahan and see if he could possibly review this pt chart for now since the pt is unable to do a video visit at this time.

## 2020-03-26 ENCOUNTER — LAB VISIT (OUTPATIENT)
Dept: LAB | Facility: OTHER | Age: 25
End: 2020-03-26
Attending: INTERNAL MEDICINE
Payer: COMMERCIAL

## 2020-03-26 DIAGNOSIS — R74.8 ELEVATED LIVER ENZYMES: Chronic | ICD-10-CM

## 2020-03-26 LAB
ALBUMIN SERPL BCP-MCNC: 4.3 G/DL (ref 3.5–5.2)
ALP SERPL-CCNC: 54 U/L (ref 55–135)
ALT SERPL W/O P-5'-P-CCNC: 181 U/L (ref 10–44)
ANION GAP SERPL CALC-SCNC: 10 MMOL/L (ref 8–16)
AST SERPL-CCNC: 58 U/L (ref 10–40)
BASOPHILS # BLD AUTO: 0.11 K/UL (ref 0–0.2)
BASOPHILS NFR BLD: 2 % (ref 0–1.9)
BILIRUB SERPL-MCNC: 0.8 MG/DL (ref 0.1–1)
BUN SERPL-MCNC: 8 MG/DL (ref 6–20)
CALCIUM SERPL-MCNC: 9.7 MG/DL (ref 8.7–10.5)
CHLORIDE SERPL-SCNC: 101 MMOL/L (ref 95–110)
CO2 SERPL-SCNC: 28 MMOL/L (ref 23–29)
CREAT SERPL-MCNC: 0.9 MG/DL (ref 0.5–1.4)
DIFFERENTIAL METHOD: ABNORMAL
EOSINOPHIL # BLD AUTO: 0.3 K/UL (ref 0–0.5)
EOSINOPHIL NFR BLD: 4.7 % (ref 0–8)
ERYTHROCYTE [DISTWIDTH] IN BLOOD BY AUTOMATED COUNT: 13.4 % (ref 11.5–14.5)
EST. GFR  (AFRICAN AMERICAN): >60 ML/MIN/1.73 M^2
EST. GFR  (NON AFRICAN AMERICAN): >60 ML/MIN/1.73 M^2
GLUCOSE SERPL-MCNC: 79 MG/DL (ref 70–110)
HCT VFR BLD AUTO: 47.4 % (ref 40–54)
HGB BLD-MCNC: 15.6 G/DL (ref 14–18)
IGG SERPL-MCNC: 1423 MG/DL (ref 650–1600)
IMM GRANULOCYTES # BLD AUTO: 0.01 K/UL (ref 0–0.04)
IMM GRANULOCYTES NFR BLD AUTO: 0.2 % (ref 0–0.5)
INR PPP: 1 (ref 0.8–1.2)
LYMPHOCYTES # BLD AUTO: 2.6 K/UL (ref 1–4.8)
LYMPHOCYTES NFR BLD: 46.5 % (ref 18–48)
MCH RBC QN AUTO: 29.3 PG (ref 27–31)
MCHC RBC AUTO-ENTMCNC: 32.9 G/DL (ref 32–36)
MCV RBC AUTO: 89 FL (ref 82–98)
MONOCYTES # BLD AUTO: 0.5 K/UL (ref 0.3–1)
MONOCYTES NFR BLD: 9.7 % (ref 4–15)
NEUTROPHILS # BLD AUTO: 2.1 K/UL (ref 1.8–7.7)
NEUTROPHILS NFR BLD: 36.9 % (ref 38–73)
NRBC BLD-RTO: 0 /100 WBC
PLATELET # BLD AUTO: 251 K/UL (ref 150–350)
PMV BLD AUTO: 11.6 FL (ref 9.2–12.9)
POTASSIUM SERPL-SCNC: 4.1 MMOL/L (ref 3.5–5.1)
PROT SERPL-MCNC: 7.7 G/DL (ref 6–8.4)
PROTHROMBIN TIME: 10.8 SEC (ref 9–12.5)
RBC # BLD AUTO: 5.32 M/UL (ref 4.6–6.2)
SODIUM SERPL-SCNC: 139 MMOL/L (ref 136–145)
WBC # BLD AUTO: 5.55 K/UL (ref 3.9–12.7)

## 2020-03-26 PROCEDURE — 86038 ANTINUCLEAR ANTIBODIES: CPT

## 2020-03-26 PROCEDURE — 80053 COMPREHEN METABOLIC PANEL: CPT

## 2020-03-26 PROCEDURE — 85025 COMPLETE CBC W/AUTO DIFF WBC: CPT

## 2020-03-26 PROCEDURE — 86256 FLUORESCENT ANTIBODY TITER: CPT

## 2020-03-26 PROCEDURE — 82784 ASSAY IGA/IGD/IGG/IGM EACH: CPT

## 2020-03-26 PROCEDURE — 36415 COLL VENOUS BLD VENIPUNCTURE: CPT

## 2020-03-26 PROCEDURE — 85610 PROTHROMBIN TIME: CPT

## 2020-03-27 LAB
ANA SER QL IF: NORMAL
SMOOTH MUSCLE AB TITR SER IF: ABNORMAL {TITER}

## 2020-08-06 ENCOUNTER — TELEPHONE (OUTPATIENT)
Dept: HEPATOLOGY | Facility: CLINIC | Age: 25
End: 2020-08-06

## 2020-08-06 NOTE — TELEPHONE ENCOUNTER
I called the pt back and let him know that one of our doctor's schedules opened up on the 14th when he wanted to come in. I was able to schedule him at 3:30pm

## 2020-08-06 NOTE — TELEPHONE ENCOUNTER
I spoke to the pt. He has to look at his schedule and he will call back once he sees what day he is off.    ----- Message from Maribel Anaya MA sent at 8/4/2020  4:35 PM CDT -----    ----- Message -----  From: Vonnie Wiggins  Sent: 8/4/2020   1:43 PM CDT  To: [facility] Staff    Pt is calling to schedule appt. Please call     Pt#[phone]

## 2020-08-14 ENCOUNTER — LAB VISIT (OUTPATIENT)
Dept: LAB | Facility: HOSPITAL | Age: 25
End: 2020-08-14
Attending: STUDENT IN AN ORGANIZED HEALTH CARE EDUCATION/TRAINING PROGRAM
Payer: COMMERCIAL

## 2020-08-14 ENCOUNTER — OFFICE VISIT (OUTPATIENT)
Dept: HEPATOLOGY | Facility: CLINIC | Age: 25
End: 2020-08-14
Payer: COMMERCIAL

## 2020-08-14 VITALS
HEIGHT: 72 IN | OXYGEN SATURATION: 96 % | DIASTOLIC BLOOD PRESSURE: 78 MMHG | RESPIRATION RATE: 20 BRPM | WEIGHT: 146.63 LBS | HEART RATE: 90 BPM | SYSTOLIC BLOOD PRESSURE: 118 MMHG | BODY MASS INDEX: 19.86 KG/M2

## 2020-08-14 DIAGNOSIS — R74.8 ELEVATED LIVER ENZYMES: Chronic | ICD-10-CM

## 2020-08-14 DIAGNOSIS — R74.8 ELEVATED LIVER ENZYMES: Primary | Chronic | ICD-10-CM

## 2020-08-14 PROBLEM — F90.9 ADHD: Status: ACTIVE | Noted: 2020-08-14

## 2020-08-14 LAB
ALBUMIN SERPL BCP-MCNC: 4.4 G/DL (ref 3.5–5.2)
ALP SERPL-CCNC: 55 U/L (ref 55–135)
ALT SERPL W/O P-5'-P-CCNC: 9 U/L (ref 10–44)
ANION GAP SERPL CALC-SCNC: 8 MMOL/L (ref 8–16)
AST SERPL-CCNC: 20 U/L (ref 10–40)
BASOPHILS # BLD AUTO: 0.09 K/UL (ref 0–0.2)
BASOPHILS NFR BLD: 1.6 % (ref 0–1.9)
BILIRUB SERPL-MCNC: 0.5 MG/DL (ref 0.1–1)
BUN SERPL-MCNC: 10 MG/DL (ref 6–20)
CALCIUM SERPL-MCNC: 9.9 MG/DL (ref 8.7–10.5)
CERULOPLASMIN SERPL-MCNC: 26 MG/DL (ref 15–45)
CHLORIDE SERPL-SCNC: 103 MMOL/L (ref 95–110)
CO2 SERPL-SCNC: 26 MMOL/L (ref 23–29)
CREAT SERPL-MCNC: 0.9 MG/DL (ref 0.5–1.4)
DIFFERENTIAL METHOD: NORMAL
EOSINOPHIL # BLD AUTO: 0.2 K/UL (ref 0–0.5)
EOSINOPHIL NFR BLD: 3 % (ref 0–8)
ERYTHROCYTE [DISTWIDTH] IN BLOOD BY AUTOMATED COUNT: 12 % (ref 11.5–14.5)
EST. GFR  (AFRICAN AMERICAN): >60 ML/MIN/1.73 M^2
EST. GFR  (NON AFRICAN AMERICAN): >60 ML/MIN/1.73 M^2
GLUCOSE SERPL-MCNC: 77 MG/DL (ref 70–110)
HCT VFR BLD AUTO: 46.1 % (ref 40–54)
HGB BLD-MCNC: 15.4 G/DL (ref 14–18)
IMM GRANULOCYTES # BLD AUTO: 0.01 K/UL (ref 0–0.04)
IMM GRANULOCYTES NFR BLD AUTO: 0.2 % (ref 0–0.5)
LYMPHOCYTES # BLD AUTO: 2.6 K/UL (ref 1–4.8)
LYMPHOCYTES NFR BLD: 44.8 % (ref 18–48)
MCH RBC QN AUTO: 29.4 PG (ref 27–31)
MCHC RBC AUTO-ENTMCNC: 33.4 G/DL (ref 32–36)
MCV RBC AUTO: 88 FL (ref 82–98)
MONOCYTES # BLD AUTO: 0.6 K/UL (ref 0.3–1)
MONOCYTES NFR BLD: 9.5 % (ref 4–15)
NEUTROPHILS # BLD AUTO: 2.4 K/UL (ref 1.8–7.7)
NEUTROPHILS NFR BLD: 40.9 % (ref 38–73)
NRBC BLD-RTO: 0 /100 WBC
PLATELET # BLD AUTO: 223 K/UL (ref 150–350)
PMV BLD AUTO: 12 FL (ref 9.2–12.9)
POTASSIUM SERPL-SCNC: 4.2 MMOL/L (ref 3.5–5.1)
PROT SERPL-MCNC: 7.8 G/DL (ref 6–8.4)
RBC # BLD AUTO: 5.23 M/UL (ref 4.6–6.2)
SODIUM SERPL-SCNC: 137 MMOL/L (ref 136–145)
WBC # BLD AUTO: 5.76 K/UL (ref 3.9–12.7)

## 2020-08-14 PROCEDURE — 86235 NUCLEAR ANTIGEN ANTIBODY: CPT | Mod: 91

## 2020-08-14 PROCEDURE — 86256 FLUORESCENT ANTIBODY TITER: CPT

## 2020-08-14 PROCEDURE — 85025 COMPLETE CBC W/AUTO DIFF WBC: CPT

## 2020-08-14 PROCEDURE — 86706 HEP B SURFACE ANTIBODY: CPT

## 2020-08-14 PROCEDURE — 99204 OFFICE O/P NEW MOD 45 MIN: CPT | Mod: S$GLB,,, | Performed by: STUDENT IN AN ORGANIZED HEALTH CARE EDUCATION/TRAINING PROGRAM

## 2020-08-14 PROCEDURE — 99999 PR PBB SHADOW E&M-EST. PATIENT-LVL III: ICD-10-PCS | Mod: PBBFAC,,, | Performed by: STUDENT IN AN ORGANIZED HEALTH CARE EDUCATION/TRAINING PROGRAM

## 2020-08-14 PROCEDURE — 80053 COMPREHEN METABOLIC PANEL: CPT

## 2020-08-14 PROCEDURE — 99999 PR PBB SHADOW E&M-EST. PATIENT-LVL III: CPT | Mod: PBBFAC,,, | Performed by: STUDENT IN AN ORGANIZED HEALTH CARE EDUCATION/TRAINING PROGRAM

## 2020-08-14 PROCEDURE — 36415 COLL VENOUS BLD VENIPUNCTURE: CPT

## 2020-08-14 PROCEDURE — 99204 PR OFFICE/OUTPT VISIT, NEW, LEVL IV, 45-59 MIN: ICD-10-PCS | Mod: S$GLB,,, | Performed by: STUDENT IN AN ORGANIZED HEALTH CARE EDUCATION/TRAINING PROGRAM

## 2020-08-14 PROCEDURE — 82103 ALPHA-1-ANTITRYPSIN TOTAL: CPT

## 2020-08-14 PROCEDURE — 86709 HEPATITIS A IGM ANTIBODY: CPT

## 2020-08-14 PROCEDURE — 82390 ASSAY OF CERULOPLASMIN: CPT

## 2020-08-14 PROCEDURE — 86704 HEP B CORE ANTIBODY TOTAL: CPT

## 2020-08-14 PROCEDURE — 86790 VIRUS ANTIBODY NOS: CPT

## 2020-08-14 PROCEDURE — 87340 HEPATITIS B SURFACE AG IA: CPT

## 2020-08-14 PROCEDURE — 86038 ANTINUCLEAR ANTIBODIES: CPT

## 2020-08-14 NOTE — PROGRESS NOTES
Hepatology Consult Note    Referring provider: Dr. Ran Garcia    Chief complaint: elevated LFTs    HPI:  Chapito Liu is a 25 y.o. male who was referred to Hepatology Clinic for consultation of elevated LFTs. He reports a several year history of vague RUQ discomfort that he has attributes to poor diet. It is intermittent and usually occurs at night. Approximately 5 months ago he developed nausea, vomiting, and worsening RUQ pain for which he presented to ED. He was found to have transaminases >1000. Labs otherwise unremarkable including viral serologies. Abdominal US was also unremarkable. His nausea and vomiting resolved after 24 hours though he continues to have intermittent RUQ discomfort. Prior to his ED visit, he had no history of liver disease. He previously drank alcohol daily x 5 year usually drinking anywhere from 1/2 pint to 1/5 gallon. He quit drinking after his ED visit. His only medication is guanfacine which he has taken >10 years. He rarely uses ibuprofen and Nyquil. No recent antibiotic use. Denies use of other medications including OTC and supplements. He has a tattoo that was placed in a tattoo parlor several years ago. No known family history of liver disease. Has never received a blood transfusion. Denies history of IVDU. Denies high risk sexual activity. He also denies recent hematemesis, coffee ground emesis, melena, hematochezia, jaundice, confusion, LE edema, abdominal distension.    Patient Active Problem List   Diagnosis    Elevated liver enzymes    ADHD     Past Medical History:   Diagnosis Date    ADHD 8/14/2020     History reviewed. No pertinent surgical history.    Family History   Problem Relation Age of Onset    No Known Problems Mother     No Known Problems Father      Social History     Socioeconomic History    Marital status: Single     Spouse name: Not on file    Number of children: Not on file    Years of education: Not on file    Highest education level: Not  on file   Occupational History    Not on file   Social Needs    Financial resource strain: Not on file    Food insecurity     Worry: Not on file     Inability: Not on file    Transportation needs     Medical: Not on file     Non-medical: Not on file   Tobacco Use    Smoking status: Current Every Day Smoker    Smokeless tobacco: Never Used   Substance and Sexual Activity    Alcohol use: Yes    Drug use: No    Sexual activity: Yes   Lifestyle    Physical activity     Days per week: Not on file     Minutes per session: Not on file    Stress: Not on file   Relationships    Social connections     Talks on phone: Not on file     Gets together: Not on file     Attends Buddhism service: Not on file     Active member of club or organization: Not on file     Attends meetings of clubs or organizations: Not on file     Relationship status: Not on file   Other Topics Concern    Not on file   Social History Narrative    Not on file     Current Outpatient Medications   Medication Sig Dispense Refill    guanFACINE 4 mg Tb24 Take by mouth.      mupirocin (BACTROBAN) 2 % ointment Apply topically to wound twice a day 1 Tube 0     No current facility-administered medications for this visit.        Review of patient's allergies indicates:  No Known Allergies    Review of Systems   Constitutional: Negative for chills, fever and weight loss.   HENT: Negative for congestion and sore throat.    Eyes: Negative for blurred vision and double vision.   Respiratory: Negative for cough, sputum production and shortness of breath.    Cardiovascular: Negative for chest pain, orthopnea and leg swelling.   Gastrointestinal: Positive for abdominal pain (RUQ abdominal discomfort). Negative for blood in stool, constipation, diarrhea, melena, nausea and vomiting.   Genitourinary: Negative for dysuria and hematuria.   Musculoskeletal: Negative for falls, joint pain and myalgias.   Skin: Negative for itching and rash.   Neurological:  Negative for weakness and headaches.   Psychiatric/Behavioral: Negative for memory loss. The patient does not have insomnia.        Vitals:    08/14/20 1527   BP: 118/78   Pulse: 90   Resp: 20   SpO2: 96%   Weight: 66.5 kg (146 lb 9.7 oz)   Height: 6' (1.829 m)       Physical Exam  Vitals signs reviewed.   Constitutional:       General: He is not in acute distress.  Eyes:      General: No scleral icterus.     Extraocular Movements: Extraocular movements intact.      Conjunctiva/sclera: Conjunctivae normal.   Cardiovascular:      Rate and Rhythm: Normal rate and regular rhythm.   Pulmonary:      Effort: Pulmonary effort is normal.      Breath sounds: Normal breath sounds. No wheezing, rhonchi or rales.   Abdominal:      General: Bowel sounds are normal. There is no distension.      Palpations: Abdomen is soft.      Tenderness: There is abdominal tenderness (RUQ). There is no guarding or rebound.   Musculoskeletal:         General: No swelling or deformity.      Right lower leg: No edema.      Left lower leg: No edema.   Skin:     General: Skin is warm and dry.      Coloration: Skin is not jaundiced.   Neurological:      General: No focal deficit present.      Mental Status: He is alert and oriented to person, place, and time.   Psychiatric:         Mood and Affect: Mood normal.         Behavior: Behavior normal.         LABS: I personally reviewed pertinent laboratory findings.    Lab Results   Component Value Date    WBC 5.55 03/26/2020    HGB 15.6 03/26/2020    HCT 47.4 03/26/2020    MCV 89 03/26/2020     03/26/2020       Lab Results   Component Value Date     03/26/2020    K 4.1 03/26/2020     03/26/2020    CO2 28 03/26/2020    BUN 8 03/26/2020    CREATININE 0.9 03/26/2020    CALCIUM 9.7 03/26/2020    ANIONGAP 10 03/26/2020    ESTGFRAFRICA >60 03/26/2020    EGFRNONAA >60 03/26/2020       Lab Results   Component Value Date     (H) 03/26/2020    AST 58 (H) 03/26/2020    ALKPHOS 54 (L)  03/26/2020    BILITOT 0.8 03/26/2020       Lab Results   Component Value Date    HEPAIGM Negative 03/22/2020    HEPBIGM Negative 03/22/2020    HEPCAB Negative 03/22/2020       Lab Results   Component Value Date    SMOOTHMUSCAB Positive 1:40 (A) 03/26/2020       I personally reviewed all recent lab results.    I personally reviewed imaging studies.      Assessment:  25 y.o. male who was referred to Hepatology Clinic for consultation of elevated LFTs with transaminases >1000. Labs shortly after significantly improved with AST 58 and . The rapid improvement in LFTs and associated nausea and vomiting suggest a viral etiology. Serologies for HAV, HBV, and HCV were negative. He did have a weakly positive ASMA though rapid improvement in transaminases without therapy argues against AIH. No culprit drugs or ingestions to suggest DILI. US without cholelithiasis.    Recommendation(s):  1. Elevated liver enzymes    - Will repeat LFTs, viral serologies, autoimmune serologies  - Will also check ceruloplasmin and A1AT levels  - If his LFTs remain elevated and work up unrevealing, he may require liver biopsy.    RTC in 1 month.    Corbin Stacy MD  Staff Physician  Hepatology and Liver Transplant  [facility]  [facility] [place]

## 2020-08-14 NOTE — LETTER
August 14, 2020      Ran Garcia, DO  [address]  [facility] [address]           [facility] 1st [facility]  [address]  [facility] [address]  Phone: [phone]  Fax: [phone]          Patient: Chapito Liu   MR Number: [id number]   YOB: 1995   Date of Visit: 8/14/2020       Dear Dr. Ran Garcia:    Thank you for referring Chapito Liu to me for evaluation. Attached you will find relevant portions of my assessment and plan of care.    If you have questions, please do not hesitate to call me. I look forward to following Chapito Liu along with you.    Sincerely,    Corbin Stacy MD    Enclosure  CC:  No Recipients    If you would like to receive this communication electronically, please contact [email or web address] or [phone] to request more information on [facility] [employer] access.    For providers and/or their staff who would like to refer a patient to [facility], please contact us through our one-stop-shop provider referral line, [facility], at [phone].    If you feel you have received this communication in error or would no longer like to receive these types of communications, please e-mail [email or web address]

## 2020-08-17 LAB
HAV IGM SERPL QL IA: NEGATIVE
HBV CORE AB SERPL QL IA: NEGATIVE
HBV SURFACE AB SER QL IA: POSITIVE
HBV SURFACE AB SERPL IA-ACNC: NORMAL MIU/ML
HBV SURFACE AG SERPL QL IA: NEGATIVE
HEPATITIS A ANTIBODY, IGG: POSITIVE
MITOCHONDRIA AB TITR SER IF: NORMAL {TITER}
SMOOTH MUSCLE AB TITR SER IF: NORMAL {TITER}

## 2020-08-18 LAB — ANA SER QL IF: NORMAL

## 2020-08-22 LAB
A1AT PROTEOTYPE S/Z, LC-MS/MS: NORMAL
A1AT SERPL NEPH-MCNC: 153 MG/DL (ref 100–190)

## 2020-09-18 ENCOUNTER — LAB VISIT (OUTPATIENT)
Dept: LAB | Facility: HOSPITAL | Age: 25
End: 2020-09-18
Attending: STUDENT IN AN ORGANIZED HEALTH CARE EDUCATION/TRAINING PROGRAM
Payer: COMMERCIAL

## 2020-09-18 ENCOUNTER — OFFICE VISIT (OUTPATIENT)
Dept: HEPATOLOGY | Facility: CLINIC | Age: 25
End: 2020-09-18
Payer: COMMERCIAL

## 2020-09-18 VITALS
RESPIRATION RATE: 20 BRPM | DIASTOLIC BLOOD PRESSURE: 84 MMHG | OXYGEN SATURATION: 98 % | HEIGHT: 72 IN | BODY MASS INDEX: 20.04 KG/M2 | WEIGHT: 147.94 LBS | SYSTOLIC BLOOD PRESSURE: 119 MMHG | HEART RATE: 75 BPM

## 2020-09-18 DIAGNOSIS — R79.89 ELEVATED LFTS: Primary | ICD-10-CM

## 2020-09-18 DIAGNOSIS — R79.89 ELEVATED LFTS: ICD-10-CM

## 2020-09-18 LAB
ALBUMIN SERPL BCP-MCNC: 4 G/DL (ref 3.5–5.2)
ALP SERPL-CCNC: 64 U/L (ref 55–135)
ALT SERPL W/O P-5'-P-CCNC: 6 U/L (ref 10–44)
AST SERPL-CCNC: 16 U/L (ref 10–40)
BILIRUB DIRECT SERPL-MCNC: 0.2 MG/DL (ref 0.1–0.3)
BILIRUB SERPL-MCNC: 0.4 MG/DL (ref 0.1–1)
PROT SERPL-MCNC: 7.4 G/DL (ref 6–8.4)

## 2020-09-18 PROCEDURE — 36415 COLL VENOUS BLD VENIPUNCTURE: CPT

## 2020-09-18 PROCEDURE — 3008F BODY MASS INDEX DOCD: CPT | Mod: CPTII,S$GLB,, | Performed by: STUDENT IN AN ORGANIZED HEALTH CARE EDUCATION/TRAINING PROGRAM

## 2020-09-18 PROCEDURE — 99214 PR OFFICE/OUTPT VISIT, EST, LEVL IV, 30-39 MIN: ICD-10-PCS | Mod: S$GLB,,, | Performed by: STUDENT IN AN ORGANIZED HEALTH CARE EDUCATION/TRAINING PROGRAM

## 2020-09-18 PROCEDURE — 80076 HEPATIC FUNCTION PANEL: CPT

## 2020-09-18 PROCEDURE — 99214 OFFICE O/P EST MOD 30 MIN: CPT | Mod: S$GLB,,, | Performed by: STUDENT IN AN ORGANIZED HEALTH CARE EDUCATION/TRAINING PROGRAM

## 2020-09-18 PROCEDURE — 3008F PR BODY MASS INDEX (BMI) DOCUMENTED: ICD-10-PCS | Mod: CPTII,S$GLB,, | Performed by: STUDENT IN AN ORGANIZED HEALTH CARE EDUCATION/TRAINING PROGRAM

## 2020-09-18 PROCEDURE — 99999 PR PBB SHADOW E&M-EST. PATIENT-LVL III: ICD-10-PCS | Mod: PBBFAC,,, | Performed by: STUDENT IN AN ORGANIZED HEALTH CARE EDUCATION/TRAINING PROGRAM

## 2020-09-18 PROCEDURE — 99999 PR PBB SHADOW E&M-EST. PATIENT-LVL III: CPT | Mod: PBBFAC,,, | Performed by: STUDENT IN AN ORGANIZED HEALTH CARE EDUCATION/TRAINING PROGRAM

## 2020-09-18 RX ORDER — DULOXETIN HYDROCHLORIDE 60 MG/1
CAPSULE, DELAYED RELEASE ORAL
COMMUNITY
Start: 2020-09-02

## 2020-09-18 NOTE — PROGRESS NOTES
Hepatology Follow Up Note    Referring provider: No ref. provider found  PCP: Rhonda Lugo MD    Chief complaint: follow up elevated LFTs    HPI: Chapito Liu is a 25 y.o. male who was referred to Hepatology Clinic for consultation of elevated LFTs. He reports a several year history of vague RUQ discomfort that he has attributes to poor diet. It is intermittent and usually occurs at night. Approximately 5 months ago he developed nausea, vomiting, and worsening RUQ pain for which he presented to ED. He was found to have transaminases >1000. Labs otherwise unremarkable including viral serologies. Abdominal US was also unremarkable. His nausea and vomiting resolved after 24 hours though he continues to have intermittent RUQ discomfort. Prior to his ED visit, he had no history of liver disease. He previously drank alcohol daily x 5 year usually drinking anywhere from 1/2 pint to 1/5 gallon. He quit drinking after his ED visit. His only medication is guanfacine which he has taken >10 years. He rarely uses ibuprofen and Nyquil. No recent antibiotic use. Denies use of other medications including OTC and supplements. He has a tattoo that was placed in a tattoo parlor several years ago. No known family history of liver disease. Has never received a blood transfusion. Denies history of IVDU. Denies high risk sexual activity.    Interval History:   Chapito iLu is a 25 y.o. male with history of elevated LFTs who presents for scheduled follow up.  He is without complaints today.  He has not had recent hospitalizations or ED visits.  Since his last visit he reports resolution of his abdominal pain. He denies recent hematemesis, coffee ground emesis, melena, hematochezia, jaundice, confusion, LE edema, abdominal distension.    Past Medical History:   Diagnosis Date    ADHD 8/14/2020       No past surgical history on file.    Family History   Problem Relation Age of Onset    No Known Problems Mother     No  Known Problems Father        Social History     Tobacco Use    Smoking status: Current Every Day Smoker    Smokeless tobacco: Never Used   Substance Use Topics    Alcohol use: Yes    Drug use: No       Current Outpatient Medications   Medication Sig Dispense Refill    DULoxetine (CYMBALTA) 60 MG capsule TK ONE C PO D AFTER FINISHING 30MG CS      guanFACINE 4 mg Tb24 Take by mouth.      mupirocin (BACTROBAN) 2 % ointment Apply topically to wound twice a day 1 Tube 0     No current facility-administered medications for this visit.        Review of patient's allergies indicates:  No Known Allergies    Review of Systems   Constitutional: Negative for chills, fever and weight loss.   HENT: Negative for congestion and sore throat.    Eyes: Negative for blurred vision and double vision.   Respiratory: Negative for cough, sputum production and shortness of breath.    Cardiovascular: Negative for chest pain, orthopnea and leg swelling.   Gastrointestinal: Negative for abdominal pain, blood in stool, constipation, diarrhea, melena, nausea and vomiting.   Genitourinary: Negative for dysuria and hematuria.   Musculoskeletal: Negative for falls, joint pain and myalgias.   Skin: Negative for itching and rash.   Neurological: Negative for tingling, focal weakness, seizures and headaches.   Endo/Heme/Allergies: Does not bruise/bleed easily.   Psychiatric/Behavioral: Negative for memory loss and substance abuse. The patient does not have insomnia.        Vitals:    09/18/20 1521   BP: 119/84   Pulse: 75   Resp: 20   SpO2: 98%   Weight: 67.1 kg (147 lb 14.9 oz)   Height: 6' (1.829 m)       Physical Exam  Vitals signs reviewed.   Constitutional:       General: He is not in acute distress.  Eyes:      General: No scleral icterus.     Extraocular Movements: Extraocular movements intact.      Conjunctiva/sclera: Conjunctivae normal.   Cardiovascular:      Rate and Rhythm: Normal rate and regular rhythm.   Pulmonary:      Effort:  Pulmonary effort is normal.      Breath sounds: Normal breath sounds. No wheezing, rhonchi or rales.   Abdominal:      General: Bowel sounds are normal. There is no distension.      Palpations: Abdomen is soft.      Tenderness: There is no abdominal tenderness.   Musculoskeletal:         General: No swelling or deformity.      Right lower leg: No edema.      Left lower leg: No edema.   Skin:     General: Skin is warm and dry.      Coloration: Skin is not jaundiced.   Neurological:      General: No focal deficit present.      Mental Status: He is alert and oriented to person, place, and time.   Psychiatric:         Mood and Affect: Mood normal.         Behavior: Behavior normal.         LABS: I personally reviewed pertinent laboratory findings.    Lab Results   Component Value Date    WBC 5.76 08/14/2020    HGB 15.4 08/14/2020    HCT 46.1 08/14/2020    MCV 88 08/14/2020     08/14/2020       Lab Results   Component Value Date     08/14/2020    K 4.2 08/14/2020     08/14/2020    CO2 26 08/14/2020    BUN 10 08/14/2020    CREATININE 0.9 08/14/2020    CALCIUM 9.9 08/14/2020    ANIONGAP 8 08/14/2020    ESTGFRAFRICA >60.0 08/14/2020    EGFRNONAA >60.0 08/14/2020       Lab Results   Component Value Date    ALT 9 (L) 08/14/2020    AST 20 08/14/2020    ALKPHOS 55 08/14/2020    BILITOT 0.5 08/14/2020       Lab Results   Component Value Date    HEPAIGM Negative 08/14/2020    HEPBIGM Negative 03/22/2020    HEPBCAB Negative 08/14/2020    HEPCAB Negative 03/22/2020       Lab Results   Component Value Date    SMOOTHMUSCAB Negative 1:40 08/14/2020    CERULOPLSM 26.0 08/14/2020        MELD-Na score: 6 at 3/26/2020 12:41 PM  MELD score: 6 at 3/26/2020 12:41 PM  Calculated from:  Serum Creatinine: 0.9 mg/dL (Rounded to 1 mg/dL) at 3/26/2020 12:41 PM  Serum Sodium: 139 mmol/L (Rounded to 137 mmol/L) at 3/26/2020 12:41 PM  Total Bilirubin: 0.8 mg/dL (Rounded to 1 mg/dL) at 3/26/2020 12:41 PM  INR(ratio): 1.0 at  3/26/2020 12:41 PM  Age: 25 years     IMAGING: I personally reviewed imaging studies.    Assessment:  25 y.o. male with history of elevated LFTs who presents for scheduled follow up. He was found to have transaminases >1000 in March 2020 which had normalized by the time of his initial visit in August 2020. The rapid improvement in LFTs and associated nausea and vomiting suggest a viral etiology. Serologic evaluation has been unrevealing. No culprit drugs or ingestions to suggest DILI. US without cholelithiasis.    1. Elevated LFTs        Recommendations:  - Repeat LFTs today  - If they remain normal, plan to repeat in 6 months.    Return to clinic in 6 months with labs.    Corbin Stacy MD  Staff Physician  Hepatology and Liver Transplant  [facility]  [facility] [place]

## 2020-12-28 ENCOUNTER — OCCUPATIONAL HEALTH (OUTPATIENT)
Dept: URGENT CARE | Facility: CLINIC | Age: 25
End: 2020-12-28
Payer: OTHER MISCELLANEOUS

## 2020-12-28 DIAGNOSIS — Z11.59 SCREENING FOR VIRAL DISEASE: Primary | ICD-10-CM

## 2020-12-28 LAB
CTP QC/QA: YES
SARS-COV-2 RDRP RESP QL NAA+PROBE: NEGATIVE

## 2020-12-28 PROCEDURE — U0002: ICD-10-PCS | Mod: QW,S$GLB,, | Performed by: NURSE PRACTITIONER

## 2020-12-28 PROCEDURE — U0002 COVID-19 LAB TEST NON-CDC: HCPCS | Mod: QW,S$GLB,, | Performed by: NURSE PRACTITIONER

## 2020-12-29 NOTE — PROGRESS NOTES
CDC Testing and Quarantine Guidelines for patients with exposure to a known-positive COVID-19 person:    A close exposure is defined as anyone who has had an exposure (masked or unmasked) to a known COVID -19 positive person within 6 ft for longer than 15 minutes. If your exposure meets this definition you are required by CDC guidelines to quarantine for at least 7-10 days from time of exposure. The CDC states that a test can be performed for an asymptomatic patient (someone who does not have any symptoms) after a close exposure, and that a test should be done if you develop symptoms after a close exposure as described above. Specifically, you can test at day 5 or later if asymptomatic, in order to get released from quarantine on day 7 or later. If you develop symptoms sooner, you should test when your symptoms start.    If you meet the definition of a close exposure, it will not matter whether you are experiencing symptoms- a quarantine for at least 7-10 days after a close exposure is required by CDC guidelines.    Please note, if you decide to test as an asymptomatic during your quarantine and you are positive, you will be restarting your quarantine and moving from a possible 10 day quarantine (if you do not test), to a 11 day or greater quarantine.    The CDC also suggests people still monitor for symptoms for a full 14 days and remember that the shorter quarantine options do not replace initial CDC guidance. The CDC continues to recommend quarantining for 14 days as the best way to reduce risk for spreading COVID-19 - however, this is only a recommendation.    If your exposure does not meet the above definition, you can return to your normal daily activities to include social distancing, wearing a mask and frequent handwashing.

## 2021-04-26 ENCOUNTER — PATIENT MESSAGE (OUTPATIENT)
Dept: RESEARCH | Facility: HOSPITAL | Age: 26
End: 2021-04-26

## 2021-07-01 ENCOUNTER — PATIENT MESSAGE (OUTPATIENT)
Dept: ADMINISTRATIVE | Facility: OTHER | Age: 26
End: 2021-07-01